# Patient Record
Sex: FEMALE | Race: WHITE | NOT HISPANIC OR LATINO | Employment: STUDENT | URBAN - METROPOLITAN AREA
[De-identification: names, ages, dates, MRNs, and addresses within clinical notes are randomized per-mention and may not be internally consistent; named-entity substitution may affect disease eponyms.]

---
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
## 2024-03-08 ENCOUNTER — OFFICE VISIT (OUTPATIENT)
Dept: FAMILY MEDICINE CLINIC | Facility: CLINIC | Age: 19
End: 2024-03-08
Payer: COMMERCIAL

## 2024-03-08 VITALS
BODY MASS INDEX: 26.66 KG/M2 | HEIGHT: 69 IN | SYSTOLIC BLOOD PRESSURE: 118 MMHG | DIASTOLIC BLOOD PRESSURE: 64 MMHG | TEMPERATURE: 98.4 F | WEIGHT: 180 LBS | RESPIRATION RATE: 20 BRPM

## 2024-03-08 DIAGNOSIS — U07.1 COVID-19 VIRUS INFECTION: ICD-10-CM

## 2024-03-08 DIAGNOSIS — Z20.822 EXPOSURE TO COVID-19 VIRUS: Primary | ICD-10-CM

## 2024-03-08 LAB
EXPIRATION DATE: NORMAL
INTERNAL CONTROL: NORMAL
Lab: NORMAL
SARS-COV-2 AG UPPER RESP QL IA.RAPID: NOT DETECTED

## 2024-03-08 NOTE — PROGRESS NOTES
"    Lavell More DO  [facility]  [address] [place] [address] [address]  [place] [id number] [phone]    Subjective      Name Edith Ward MRN [id number]    2005 AGE/SEX 18 y.o. / female      Chief Complaint Chief Complaint   Patient presents with    Exposure To Known Illness     Patient has been exposed to Covid virus, started with sore throat x 1 day ago          Visit History for  2024    History of Present Illness  Edith Ward is a 18 y.o. female who presented today for Exposure To Known Illness (Patient has been exposed to Covid virus, started with sore throat x 1 day ago )  .    The patient presents for evaluation of COVID-19 infection.    COVID-19 infection  Tested positive for COVID-19 infection. Sore throat began 2024. Denies any other health concerns. Father tested positive on 2024 or 2024.       Medications and Allergies   Current Outpatient Medications   Medication Instructions    diazePAM (VALIUM) 5 MG tablet PLEASE SEE ATTACHED FOR DETAILED DIRECTIONS    escitalopram (LEXAPRO) 20 mg, Oral, Daily    ferrous gluconate (FERGON) 324 mg, Daily With Breakfast    hydrOXYzine pamoate (VISTARIL) 25 MG capsule TAKE 1 TABLET BY MOUTH DAILY AT NIGHT FOR SLEEP    melatonin 5 mg    Naproxen Sodium 220 MG capsule Take  by mouth.    propranolol (INDERAL) 10 mg, 2 Times Daily PRN     No Known Allergies   I have reviewed the above medications and allergies     Objective:      Vitals Vitals:    24 1153   BP: 118/64   BP Location: Left arm   Patient Position: Sitting   Cuff Size: Adult   Resp: 20   Temp: 98.4 °F (36.9 °C)   TempSrc: Oral   Weight: 81.6 kg (180 lb)   Height: 175.3 cm (69.02\")     Body mass index is 26.57 kg/m².    Physical Exam  Vitals reviewed.   Constitutional:       General: She is not in acute distress.     Appearance: She is not ill-appearing.   HENT:      Nose: Rhinorrhea present.      Mouth/Throat:      Pharynx: Posterior " oropharyngeal erythema present.   Cardiovascular:      Rate and Rhythm: Normal rate and regular rhythm.   Pulmonary:      Effort: Pulmonary effort is normal.      Breath sounds: Normal breath sounds.   Neurological:      Mental Status: She is alert.   Psychiatric:         Mood and Affect: Mood normal.         Behavior: Behavior normal.         Thought Content: Thought content normal.         Judgment: Judgment normal.            Assessment/Plan      Issues Addressed/ Plan   Diagnosis Plan   1. Exposure to COVID-19 virus  POCT SARS-CoV-2 Antigen MICHELLE      2. COVID-19 virus infection           1. COVID-19 infection  She was advised to quarantine for 5 days from the onset of his symptoms. She was advised to wear a mask if she is going out in public. She was advised to use over-the-counter medicines for her symptoms. She was advised to drink cool liquids, and take Tylenol, ibuprofen, or Cepacol lozenges for her sore throat. She was advised to use a neti pot.     There are no Patient Instructions on file for this visit.  Pediatric BMI = 86 %ile (Z= 1.10) based on CDC (Girls, 2-20 Years) BMI-for-age based on BMI available as of 3/8/2024..       Follow up  recommended Return if symptoms worsen or fail to improve.   Lavell More DO     Transcribed from ambient dictation for Lavell More DO by Rachel Johns.  03/08/24   13:19 EST    Patient or patient representative verbalized consent to the visit recording.  I have personally performed the services described in this document as transcribed by the above individual, and it is both accurate and complete.

## 2024-04-09 ENCOUNTER — OFFICE VISIT (OUTPATIENT)
Dept: FAMILY MEDICINE CLINIC | Facility: CLINIC | Age: 19
End: 2024-04-09
Payer: COMMERCIAL

## 2024-04-09 VITALS
HEIGHT: 69 IN | SYSTOLIC BLOOD PRESSURE: 118 MMHG | DIASTOLIC BLOOD PRESSURE: 82 MMHG | OXYGEN SATURATION: 99 % | HEART RATE: 58 BPM | RESPIRATION RATE: 18 BRPM | BODY MASS INDEX: 26.81 KG/M2 | WEIGHT: 181 LBS

## 2024-04-09 DIAGNOSIS — S80.861A TICK BITE OF RIGHT LOWER LEG, INITIAL ENCOUNTER: ICD-10-CM

## 2024-04-09 DIAGNOSIS — F90.0 ATTENTION DEFICIT HYPERACTIVITY DISORDER (ADHD), PREDOMINANTLY INATTENTIVE TYPE: Primary | ICD-10-CM
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
## 2024-04-09 PROCEDURE — 99214 OFFICE O/P EST MOD 30 MIN: CPT | Performed by: STUDENT IN AN ORGANIZED HEALTH CARE EDUCATION/TRAINING PROGRAM

## 2024-04-09 RX ORDER — DEXTROAMPHETAMINE SACCHARATE, AMPHETAMINE ASPARTATE, DEXTROAMPHETAMINE SULFATE AND AMPHETAMINE SULFATE 2.5; 2.5; 2.5; 2.5 MG/1; MG/1; MG/1; MG/1
10 TABLET ORAL DAILY
Qty: 30 TABLET | Refills: 0 | Status: SHIPPED | OUTPATIENT
Start: 2024-04-09

## 2024-04-09 RX ORDER — DOXYCYCLINE HYCLATE 100 MG/1
100 CAPSULE ORAL 2 TIMES DAILY
Qty: 20 CAPSULE | Refills: 0 | Status: SHIPPED | OUTPATIENT
Start: 2024-04-09 | End: 2024-04-19

## 2024-04-09 NOTE — PROGRESS NOTES
Lavell More DO  [facility]  [address]  [place] [id number] [phone]    Subjective      Name Edith Ward MRN [id number]    2005 AGE/SEX 18 y.o. / female      Chief Complaint Chief Complaint   Patient presents with    Insect Bite     X 2 days ago, painful. On right leg          Visit History for  2024    History of Present Illness  Edith Ward is a 18 y.o. female who presented today for Insect Bite (X 2 days ago, painful. On right leg )        The patient reports a severe tick bite on her right proximal lower leg, which occurred two days prior. The tick, which had been attached for more than four hours, continues to exhibit swelling and an increase in size. The affected area is persistently irritated, leading to frequent itching. The patient has been self-administering topical Benadryl at night to manage the itching.    The patient expresses a desire to undergo an ADHD diagnosis and/or medication. She has a family history of ADHD, with both her mother, father, and older brother having ADHD. Currently, she is not enrolled in school and has three jobs. She expresses a desire to commence medication to improve her functionality, as she often forgets things, leaves ingredients, dishwashing, and food out, and frequently gets kicked on by her siblings. She does not believe the ADHD affects her work performance. Morning difficulty is reported, and she is uncertain if this is related to her dysfunctional ADHD. Despite her desire to attend college next year, she expresses concern that she has not made significant progress. She participated in [employer], which she did not enjoy due to forgetfulness.       Medications and Allergies   Current Outpatient Medications   Medication Instructions    amphetamine-dextroamphetamine (ADDERALL) 10 MG tablet 10 mg, Oral, Daily    doxycycline (VIBRAMYCIN) 100 mg, Oral, 2 Times Daily    escitalopram  "(LEXAPRO) 20 mg, Oral, Daily    ferrous gluconate (FERGON) 324 mg, Daily With Breakfast    melatonin 5 mg, Oral     No Known Allergies   I have reviewed the above medications and allergies     Objective:      Vitals Vitals:    04/09/24 1618   BP: 118/82   BP Location: Left arm   Patient Position: Sitting   Cuff Size: Adult   Pulse: 58   Resp: 18   SpO2: 99%   Weight: 82.1 kg (181 lb)   Height: 175.3 cm (69.02\")     Body mass index is 26.71 kg/m².    Physical Exam  Vitals reviewed.   Constitutional:       General: She is not in acute distress.     Appearance: She is not ill-appearing.   Pulmonary:      Effort: Pulmonary effort is normal.   Psychiatric:         Mood and Affect: Mood normal.         Behavior: Behavior normal.         Thought Content: Thought content normal.         Judgment: Judgment normal.       Physical Exam     Results          Assessment/Plan      Issues Addressed/ Plan   Diagnosis Plan   1. Attention deficit hyperactivity disorder (ADHD), predominantly inattentive type  amphetamine-dextroamphetamine (ADDERALL) 10 MG tablet      2. Tick bite of right lower leg, initial encounter  doxycycline (VIBRAMYCIN) 100 MG capsule         Assessment & Plan  1. Attention Deficit Hyperactivity Disorder (ADHD).  The patient will be initiated on a regimen of extended-release Adderall, to be taken twice daily, upon waking. She has been advised to establish a daily planning session as soon as she is awake, followed by a weekly planning session. Additionally, she has been advised to limit her energy drink intake.    2. Tick bite on the right proximal lower leg.  The patient has been advised against the concurrent use of iron supplements. A prescription for doxycycline has been issued, to be taken for a duration of 10 days.    Follow-up  The patient is scheduled for a follow-up visit in 1 month, or earlier if the medication proves ineffective.   Pediatric BMI = 87 %ile (Z= 1.12) based on CDC (Girls, 2-20 Years) " BMI-for-age based on BMI available as of 4/9/2024.. BMI is >= 25 and <30. (Overweight) The following options were offered after discussion;: exercise counseling/recommendations and nutrition counseling/recommendations       There are no Patient Instructions on file for this visit.        Follow up  recommended Return in about 1 month (around 5/9/2024) for adhd.   - Dragon voice recognition software was utilized to complete this chart.  Every reasonable attempt was made to edit and correct the text, however some incorrect words may remain.   Lavell More DO    Patient or patient representative verbalized consent for the use of Ambient Listening during the visit with  Lavell More DO for chart documentation. 4/29/2024  00:09 EDT

## 2024-04-11 ENCOUNTER — PRIOR AUTHORIZATION (OUTPATIENT)
Dept: FAMILY MEDICINE CLINIC | Facility: CLINIC | Age: 19
End: 2024-04-11
Payer: COMMERCIAL

## 2024-05-08 ENCOUNTER — OFFICE VISIT (OUTPATIENT)
Dept: FAMILY MEDICINE CLINIC | Facility: CLINIC | Age: 19
End: 2024-05-08
Payer: COMMERCIAL

## 2024-05-08 VITALS
WEIGHT: 174.8 LBS | BODY MASS INDEX: 25.89 KG/M2 | SYSTOLIC BLOOD PRESSURE: 122 MMHG | OXYGEN SATURATION: 100 % | HEART RATE: 65 BPM | RESPIRATION RATE: 18 BRPM | HEIGHT: 69 IN | DIASTOLIC BLOOD PRESSURE: 78 MMHG

## 2024-05-08 DIAGNOSIS — F90.0 ATTENTION DEFICIT HYPERACTIVITY DISORDER (ADHD), PREDOMINANTLY INATTENTIVE TYPE: ICD-10-CM

## 2024-05-08 PROCEDURE — 99213 OFFICE O/P EST LOW 20 MIN: CPT | Performed by: STUDENT IN AN ORGANIZED HEALTH CARE EDUCATION/TRAINING PROGRAM

## 2024-05-08 RX ORDER — DEXTROAMPHETAMINE SACCHARATE, AMPHETAMINE ASPARTATE MONOHYDRATE, DEXTROAMPHETAMINE SULFATE AND AMPHETAMINE SULFATE 2.5; 2.5; 2.5; 2.5 MG/1; MG/1; MG/1; MG/1
10 CAPSULE, EXTENDED RELEASE ORAL EVERY MORNING
Qty: 30 CAPSULE | Refills: 0 | Status: SHIPPED | OUTPATIENT
Start: 2024-05-08

## 2024-05-25 NOTE — PROGRESS NOTES
Lavell More DO  [facility]  [address]  [place] [id number] [phone]    Subjective      Name Edith Ward MRN [id number]    2005 AGE/SEX 18 y.o. / female      Chief Complaint Chief Complaint   Patient presents with    ADHD     Medication follow up          Visit History for  2024    History of Present Illness  Edith Ward is a 18 y.o. female who presented today for ADHD (Medication follow up )      The patient reports an improvement in his condition since our last consultation. She now utilizes an alarm 1 to 2 hours prior to waking, administering Adderall, and subsequently awakens once the alarm goes off the second time. She has successfully applied for a college visit and received letters and recommendations. She has been on Lexapro for an extended period, which she finds beneficial. She has not explored alternative medication options. Initially, the efficacy of Adderall was initially diminished, but it has since decreased. She acknowledges a recent increase in caffeine intake, which she finds distressing. She maintains a record of his productivity and communicates with numerous individuals. She has downloaded a calendar clifford to create three separate calendars for responses to finalize and complete tasks. She attempts to perform stretches, albeit inconsistently. She engages in horse riding, but refrains from stretching due to her hypermobility. She recently ceased employment due to a three-month schedule interruption.        Medications and Allergies   Current Outpatient Medications   Medication Instructions    amphetamine-dextroamphetamine XR (Adderall XR) 10 MG 24 hr capsule 10 mg, Oral, Every Morning    escitalopram (LEXAPRO) 20 mg, Oral, Daily    melatonin 3 mg, Oral, PRN     No Known Allergies   I have reviewed the above medications and allergies     Objective:      Vitals Vitals:    24 1126   BP: 122/78   BP Location: Right  "arm   Patient Position: Sitting   Cuff Size: Adult   Pulse: 65   Resp: 18   SpO2: 100%   Weight: 79.3 kg (174 lb 12.8 oz)   Height: 175.3 cm (69.02\")     Body mass index is 25.8 kg/m².    Physical Exam  Vitals reviewed.   Constitutional:       General: She is not in acute distress.     Appearance: She is not ill-appearing.   Cardiovascular:      Rate and Rhythm: Normal rate and regular rhythm.   Pulmonary:      Effort: Pulmonary effort is normal.      Breath sounds: Normal breath sounds.   Neurological:      Mental Status: She is alert.   Psychiatric:         Mood and Affect: Mood normal.         Behavior: Behavior normal.         Thought Content: Thought content normal.         Judgment: Judgment normal.       Physical Exam     Results          Assessment/Plan      Issues Addressed/ Plan   Diagnosis Plan   1. Attention deficit hyperactivity disorder (ADHD), predominantly inattentive type  amphetamine-dextroamphetamine XR (Adderall XR) 10 MG 24 hr capsule         Assessment & Plan  1. Attention Deficit Hyperactivity Disorder (ADHD).  The patient was counseled to abstain from Adderall on a day when she perceives a decrease in need to concentrate. A prescription for extended-release Adderall 24 mg, to be taken in the morning, was issued. The patient was also advised to implement a calendar and reminders to ensure productivity. Additionally, the patient was encouraged to engage in regular stretching exercises and to incorporate a 5-minute stretch routine with each session. Should the patient perceive the current Adderall dosage as effective, she will inform us, at which point a dosage adjustment may be necessary.    Follow-up  The patient is scheduled for a follow-up visit in 3 months.           There are no Patient Instructions on file for this visit.        Follow up  recommended Return in about 3 months (around 8/8/2024) for ADHD.   - Dragon voice recognition software was utilized to complete this chart.  Every " reasonable attempt was made to edit and correct the text, however some incorrect words may remain.   Lavell More DO    Patient or patient representative verbalized consent for the use of Ambient Listening during the visit with  Lavell More DO for chart documentation. 5/25/2024  01:16 EDT

## 2024-05-30 ENCOUNTER — PATIENT MESSAGE (OUTPATIENT)
Dept: FAMILY MEDICINE CLINIC | Facility: CLINIC | Age: 19
End: 2024-05-30
Payer: COMMERCIAL

## 2024-05-31 ENCOUNTER — TELEPHONE (OUTPATIENT)
Dept: FAMILY MEDICINE CLINIC | Facility: CLINIC | Age: 19
End: 2024-05-31
Payer: COMMERCIAL

## 2024-05-31 DIAGNOSIS — D50.8 IRON DEFICIENCY ANEMIA SECONDARY TO INADEQUATE DIETARY IRON INTAKE: Primary | ICD-10-CM

## 2024-05-31 DIAGNOSIS — F90.0 ATTENTION DEFICIT HYPERACTIVITY DISORDER (ADHD), PREDOMINANTLY INATTENTIVE TYPE: ICD-10-CM

## 2024-05-31 NOTE — TELEPHONE ENCOUNTER
Edith De [facility]man P[facility] (supporting Lavell More DO)15 hours ago (6:05 PM)       Hi! So I have figured out that the long release does approximately nothing. I would very much appreciate if I could go back on the instant release as soon as possible to help get life back in order!     Thanks!  Edith

## 2024-06-03 RX ORDER — DEXTROAMPHETAMINE SACCHARATE, AMPHETAMINE ASPARTATE MONOHYDRATE, DEXTROAMPHETAMINE SULFATE AND AMPHETAMINE SULFATE 5; 5; 5; 5 MG/1; MG/1; MG/1; MG/1
20 CAPSULE, EXTENDED RELEASE ORAL EVERY MORNING
Qty: 30 CAPSULE | Refills: 0 | Status: SHIPPED | OUTPATIENT
Start: 2024-06-03

## 2024-06-03 NOTE — TELEPHONE ENCOUNTER
Edith De [facility] (supporting Lavell More DO)Just now (9:04 AM)       Ok. I honestly don't think it will work, but we can try. My mom and brother have both experienced both types of adderall and have said that the long release does nothing, so I'm worried it won't do anything, as I remember how well the instant release worked. My sleep scedule is off and getting up the the morning is harder, but I guess we can try a dose up. Could My iron supplement also be called in? Thanks!

## 2024-06-03 NOTE — TELEPHONE ENCOUNTER
May just need to increase the dose.  Lets try one dose increase and if it doesn't work, we can go back to the IR

## 2024-06-04 RX ORDER — FERROUS GLUCONATE 324(38)MG
324 TABLET ORAL
Qty: 30 TABLET | Refills: 2 | Status: SHIPPED | OUTPATIENT
Start: 2024-06-04

## 2024-07-02 ENCOUNTER — OFFICE VISIT (OUTPATIENT)
Dept: FAMILY MEDICINE CLINIC | Facility: CLINIC | Age: 19
End: 2024-07-02
Payer: COMMERCIAL

## 2024-07-02 VITALS
SYSTOLIC BLOOD PRESSURE: 122 MMHG | RESPIRATION RATE: 18 BRPM | DIASTOLIC BLOOD PRESSURE: 64 MMHG | BODY MASS INDEX: 26.07 KG/M2 | OXYGEN SATURATION: 98 % | HEART RATE: 90 BPM | WEIGHT: 176 LBS | HEIGHT: 69 IN

## 2024-07-02 DIAGNOSIS — F90.0 ATTENTION DEFICIT HYPERACTIVITY DISORDER (ADHD), PREDOMINANTLY INATTENTIVE TYPE: Primary | ICD-10-CM

## 2024-07-02 PROCEDURE — 99213 OFFICE O/P EST LOW 20 MIN: CPT | Performed by: STUDENT IN AN ORGANIZED HEALTH CARE EDUCATION/TRAINING PROGRAM

## 2024-07-02 RX ORDER — CETIRIZINE HYDROCHLORIDE 10 MG/1
10 TABLET ORAL DAILY
COMMUNITY

## 2024-07-02 RX ORDER — DEXTROAMPHETAMINE SACCHARATE, AMPHETAMINE ASPARTATE, DEXTROAMPHETAMINE SULFATE AND AMPHETAMINE SULFATE 5; 5; 5; 5 MG/1; MG/1; MG/1; MG/1
20 TABLET ORAL 2 TIMES DAILY
Qty: 60 TABLET | Refills: 0 | Status: SHIPPED | OUTPATIENT
Start: 2024-07-02

## 2024-07-07 NOTE — PROGRESS NOTES
"    Lavell More DO  [facility]  [facility]  [place] [id number] [phone]    Subjective      Name Edith Ward MRN [id number]    2005 AGE/SEX 18 y.o. / female      Chief Complaint Chief Complaint   Patient presents with    ADHD     Would like to discuss going back to immediate release instead of extended release          Visit History for  2024    Edith Ward is a 18 y.o. female who presented today for ADHD (Would like to discuss going back to immediate release instead of extended release )     History of Present Illness  The patient presents for follow-up.    The patient reports no significant changes since her last visit. She finds the immediate-release formulation Adderall more effective, preferring the immediate release formulation in the morning. However, she expresses a lack of motivation towards the end of the day when the effects of the medication wear off. She expresses a preference for maintaining multiple pills over non-functional. The immediate release formulation has been more beneficial than the extended release formulation.       Medications and Allergies   Current Outpatient Medications   Medication Instructions    amphetamine-dextroamphetamine (ADDERALL) 20 MG tablet 20 mg, Oral, 2 Times Daily    cetirizine (ZYRTEC) 10 mg, Oral, Daily    escitalopram (LEXAPRO) 20 mg, Oral, Daily    ferrous gluconate (FERGON) 324 mg, Oral, Daily With Breakfast    melatonin 3 mg, Oral, PRN     No Known Allergies   I have reviewed the above medications and allergies     Objective:      Vitals Vitals:    24 1609   BP: 122/64   BP Location: Left arm   Patient Position: Sitting   Cuff Size: Adult   Pulse: 90   Resp: 18   SpO2: 98%   Weight: 79.8 kg (176 lb)   Height: 175.3 cm (69.02\")     Body mass index is 25.98 kg/m².    Physical Exam  Vitals reviewed.   Constitutional:       General: She is not in acute distress.     Appearance: She is not " ill-appearing.   Pulmonary:      Effort: Pulmonary effort is normal.   Psychiatric:         Mood and Affect: Mood normal.         Behavior: Behavior normal.         Thought Content: Thought content normal.         Judgment: Judgment normal.      Physical Exam       Results       Assessment/Plan   Issues Addressed/ Plan   Diagnosis Plan   1. Attention deficit hyperactivity disorder (ADHD), predominantly inattentive type  amphetamine-dextroamphetamine (ADDERALL) 20 MG tablet         Assessment & Plan  1. Medication follow-up.  The immediate-release formulation will be replaced with an immediate-release version to be taken in the afternoon if required. It is recommended that her iron levels be assessed during her next visit.    Follow-up  A follow-up appointment is scheduled for 08/07/2024.           There are no Patient Instructions on file for this visit.   Follow up  recommended No follow-ups on file.   - Dragon voice recognition software was utilized to complete this chart.  Every reasonable attempt was made to edit and correct the text, however some incorrect words may remain.   Lavell More DO    Patient or patient representative verbalized consent for the use of Ambient Listening during the visit with  Lavell More DO for chart documentation. 7/7/2024  20:37 EDT

## 2024-08-07 ENCOUNTER — OFFICE VISIT (OUTPATIENT)
Dept: FAMILY MEDICINE CLINIC | Facility: CLINIC | Age: 19
End: 2024-08-07
Payer: COMMERCIAL

## 2024-08-07 VITALS
BODY MASS INDEX: 25.33 KG/M2 | SYSTOLIC BLOOD PRESSURE: 106 MMHG | HEIGHT: 69 IN | RESPIRATION RATE: 18 BRPM | WEIGHT: 171 LBS | OXYGEN SATURATION: 100 % | DIASTOLIC BLOOD PRESSURE: 72 MMHG | HEART RATE: 84 BPM

## 2024-08-07 DIAGNOSIS — D50.8 IRON DEFICIENCY ANEMIA SECONDARY TO INADEQUATE DIETARY IRON INTAKE: ICD-10-CM

## 2024-08-07 DIAGNOSIS — R53.83 FATIGUE, UNSPECIFIED TYPE: ICD-10-CM

## 2024-08-07 DIAGNOSIS — F90.0 ATTENTION DEFICIT HYPERACTIVITY DISORDER (ADHD), PREDOMINANTLY INATTENTIVE TYPE: ICD-10-CM

## 2024-08-07 DIAGNOSIS — R73.9 HYPERGLYCEMIA: Primary | ICD-10-CM

## 2024-08-07 DIAGNOSIS — Z23 ENCOUNTER FOR IMMUNIZATION: ICD-10-CM

## 2024-08-07 PROCEDURE — 90715 TDAP VACCINE 7 YRS/> IM: CPT | Performed by: STUDENT IN AN ORGANIZED HEALTH CARE EDUCATION/TRAINING PROGRAM

## 2024-08-07 PROCEDURE — 99214 OFFICE O/P EST MOD 30 MIN: CPT | Performed by: STUDENT IN AN ORGANIZED HEALTH CARE EDUCATION/TRAINING PROGRAM

## 2024-08-07 PROCEDURE — 90471 IMMUNIZATION ADMIN: CPT | Performed by: STUDENT IN AN ORGANIZED HEALTH CARE EDUCATION/TRAINING PROGRAM

## 2024-08-07 RX ORDER — LISDEXAMFETAMINE DIMESYLATE 40 MG/1
40 CAPSULE ORAL EVERY MORNING
Qty: 30 CAPSULE | Refills: 0 | Status: SHIPPED | OUTPATIENT
Start: 2024-08-07 | End: 2024-08-12 | Stop reason: SDUPTHER

## 2024-08-07 RX ORDER — DEXTROAMPHETAMINE SACCHARATE, AMPHETAMINE ASPARTATE, DEXTROAMPHETAMINE SULFATE AND AMPHETAMINE SULFATE 5; 5; 5; 5 MG/1; MG/1; MG/1; MG/1
20 TABLET ORAL DAILY
Qty: 60 TABLET | Refills: 0 | Status: SHIPPED | OUTPATIENT
Start: 2024-08-07

## 2024-08-07 NOTE — PROGRESS NOTES
Venipuncture Blood Specimen Collection  Venipuncture performed in right arm by Lucia Guaman MA with good hemostasis. Patient tolerated the procedure well without complications.   08/07/24   Lucia Guaman MA

## 2024-08-08 ENCOUNTER — PATIENT ROUNDING (BHMG ONLY) (OUTPATIENT)
Dept: FAMILY MEDICINE CLINIC | Facility: CLINIC | Age: 19
End: 2024-08-08
Payer: COMMERCIAL

## 2024-08-08 LAB
ALBUMIN SERPL-MCNC: 4.3 G/DL (ref 4–5)
ALP SERPL-CCNC: 73 IU/L (ref 42–106)
ALT SERPL-CCNC: 13 IU/L (ref 0–32)
AST SERPL-CCNC: 21 IU/L (ref 0–40)
BASOPHILS # BLD AUTO: 0 X10E3/UL (ref 0–0.2)
BASOPHILS NFR BLD AUTO: 1 %
BILIRUB SERPL-MCNC: 0.2 MG/DL (ref 0–1.2)
BUN SERPL-MCNC: 8 MG/DL (ref 6–20)
BUN/CREAT SERPL: 12 (ref 9–23)
CALCIUM SERPL-MCNC: 9.6 MG/DL (ref 8.7–10.2)
CHLORIDE SERPL-SCNC: 103 MMOL/L (ref 96–106)
CO2 SERPL-SCNC: 24 MMOL/L (ref 20–29)
CREAT SERPL-MCNC: 0.69 MG/DL (ref 0.57–1)
EGFRCR SERPLBLD CKD-EPI 2021: 128 ML/MIN/1.73
EOSINOPHIL # BLD AUTO: 0.2 X10E3/UL (ref 0–0.4)
EOSINOPHIL NFR BLD AUTO: 2 %
ERYTHROCYTE [DISTWIDTH] IN BLOOD BY AUTOMATED COUNT: 11.7 % (ref 11.7–15.4)
GLOBULIN SER CALC-MCNC: 2.5 G/DL (ref 1.5–4.5)
GLUCOSE SERPL-MCNC: 93 MG/DL (ref 70–99)
HCT VFR BLD AUTO: 41.5 % (ref 34–46.6)
HGB BLD-MCNC: 13.5 G/DL (ref 11.1–15.9)
IMM GRANULOCYTES # BLD AUTO: 0 X10E3/UL (ref 0–0.1)
IMM GRANULOCYTES NFR BLD AUTO: 0 %
IRON SATN MFR SERPL: 19 % (ref 15–55)
IRON SERPL-MCNC: 51 UG/DL (ref 27–159)
LYMPHOCYTES # BLD AUTO: 2.6 X10E3/UL (ref 0.7–3.1)
LYMPHOCYTES NFR BLD AUTO: 38 %
MCH RBC QN AUTO: 29.5 PG (ref 26.6–33)
MCHC RBC AUTO-ENTMCNC: 32.5 G/DL (ref 31.5–35.7)
MCV RBC AUTO: 91 FL (ref 79–97)
MONOCYTES # BLD AUTO: 0.5 X10E3/UL (ref 0.1–0.9)
MONOCYTES NFR BLD AUTO: 7 %
NEUTROPHILS # BLD AUTO: 3.6 X10E3/UL (ref 1.4–7)
NEUTROPHILS NFR BLD AUTO: 52 %
PLATELET # BLD AUTO: 245 X10E3/UL (ref 150–450)
POTASSIUM SERPL-SCNC: 4 MMOL/L (ref 3.5–5.2)
PROT SERPL-MCNC: 6.8 G/DL (ref 6–8.5)
RBC # BLD AUTO: 4.57 X10E6/UL (ref 3.77–5.28)
SODIUM SERPL-SCNC: 140 MMOL/L (ref 134–144)
TIBC SERPL-MCNC: 271 UG/DL (ref 250–450)
UIBC SERPL-MCNC: 220 UG/DL (ref 131–425)
WBC # BLD AUTO: 7 X10E3/UL (ref 3.4–10.8)

## 2024-08-08 NOTE — PROGRESS NOTES
A Mychart message has been sent to the patient [facility] sent to the patient for PATIENT ROUNDING with [facility]

## 2024-08-12 ENCOUNTER — TELEPHONE (OUTPATIENT)
Dept: FAMILY MEDICINE CLINIC | Facility: CLINIC | Age: 19
End: 2024-08-12
Payer: COMMERCIAL

## 2024-08-12 DIAGNOSIS — F90.0 ATTENTION DEFICIT HYPERACTIVITY DISORDER (ADHD), PREDOMINANTLY INATTENTIVE TYPE: ICD-10-CM

## 2024-08-12 NOTE — TELEPHONE ENCOUNTER
----- Message from [facility] [employer] sent at 8/12/2024  4:36 PM EDT -----  Regarding: Vyvanse  Contact: [phone]  [facility] in [place] would be preferable

## 2024-08-12 NOTE — TELEPHONE ENCOUNTER
Caller: Edith Ward    Relationship: Self    Best call back number: [phone]    Who are you requesting to speak with (clinical staff, provider,  specific staff member): CLINICAL STAFF    What was the call regarding: STATED THAT THEY WOULD LIKE TO KNOW WHERE THE lisdexamfetamine (Vyvanse) 40 MG capsule HAS HAD THE MOST SUCCESS AT BEING CALLED IN SO THEY CAN GET IT AS THEIR REGULAR PHARMACY IS CURRENTLY ON BACK ORDER FOR IT. PLEASE REACH OUT THROUGH [email or web address] AND ADVISE     Is it okay if the provider responds through [employer]: YES, PREFERRED

## 2024-08-13 RX ORDER — LISDEXAMFETAMINE DIMESYLATE 40 MG/1
40 CAPSULE ORAL EVERY MORNING
Qty: 30 CAPSULE | Refills: 0 | Status: SHIPPED | OUTPATIENT
Start: 2024-08-13

## 2024-08-18 NOTE — PROGRESS NOTES
Lavell More DO  [facility]  [facility]  [place] [id number] [phone]    Subjective      Name Edith Ward MRN [id number]    2005 AGE/SEX 18 y.o. / female      Chief Complaint Chief Complaint   Patient presents with    ADHD     Check up          Visit History for  2024    Edith Ward is a 18 y.o. female who presented today for ADHD (Check up )     History of Present Illness  The patient presents for evaluation of ADHD.    She reports that her current medications are effective, but she feels the need to increase the dosage of her morning medication due to her upcoming move to college. She is considering taking immediate medication in the morning and afternoon. The extended-release version of her medication was not particularly fond of, but it provided relief for the rest of the day. She has resumed drinking energy drinks, particularly in the mornings. She has not tried Vyvanse, but is open to trying it. She typically takes her medication on an empty stomach.    She discontinued her iron supplement 1 to 2 days ago. As the heat conditions worsen, her orthostatic issues have worsened. She also experiences numbness in her foot.    She occasionally experiences a sensation in her neck dislocating, which resolves after applying pressure. This sensation occurs roughly once a month.    Supplemental Information  She had a horrible fear of needles, but she has gotten a lot better.    IMMUNIZATIONS  She received her meningococcal vaccine 2 months ago.       Medications and Allergies   Current Outpatient Medications   Medication Instructions    amphetamine-dextroamphetamine (ADDERALL) 20 MG tablet 20 mg, Oral, Daily    cetirizine (ZYRTEC) 10 mg, Oral, Daily    escitalopram (LEXAPRO) 20 mg, Oral, Daily    ferrous gluconate (FERGON) 324 mg, Oral, Daily With Breakfast    lisdexamfetamine (VYVANSE) 40 mg, Oral, Every Morning    melatonin 3 mg, Oral, PRN     " No Known Allergies   I have reviewed the above medications and allergies     Objective:      Vitals Vitals:    08/07/24 0900   BP: 106/72   BP Location: Left arm   Patient Position: Sitting   Cuff Size: Adult   Pulse: 84   Resp: 18   SpO2: 100%   Weight: 77.6 kg (171 lb)   Height: 175.3 cm (69.02\")     Body mass index is 25.24 kg/m².    Physical Exam  Vitals reviewed.   Constitutional:       General: She is not in acute distress.     Appearance: She is not ill-appearing.   Pulmonary:      Effort: Pulmonary effort is normal.   Psychiatric:         Mood and Affect: Mood normal.         Behavior: Behavior normal.         Thought Content: Thought content normal.         Judgment: Judgment normal.        Physical Exam       Results       Assessment/Plan   Issues Addressed/ Plan   Diagnosis Plan   1. Hyperglycemia  Comprehensive metabolic panel      2. Attention deficit hyperactivity disorder (ADHD), predominantly inattentive type  amphetamine-dextroamphetamine (ADDERALL) 20 MG tablet      3. Fatigue, unspecified type  CBC w AUTO Differential    Comprehensive metabolic panel      4. Iron deficiency anemia secondary to inadequate dietary iron intake  CBC w AUTO Differential    Iron Profile      5. Encounter for immunization  Tdap Vaccine Greater Than or Equal To 8yo IM         Assessment & Plan  1. Attention deficit hyperactivity disorder.  A prescription for Vyvanse has been provided, along with a prescription for immediate-release Adderall 20 mg.    2. Orthostatic hypotension.  A blood test will be conducted to assess her iron levels and electrolytes. She has been advised to maintain adequate hydration.    3. Neck pain.  She has been advised to massage her neck to alleviate discomfort. Should the neck pain persist, she is to inform me.    Follow-up  A follow-up visit is scheduled for 6 months from now.           There are no Patient Instructions on file for this visit.   Follow up  recommended Return in about 6 months " (around 2/7/2025).   - Dragon voice recognition software was utilized to complete this chart.  Every reasonable attempt was made to edit and correct the text, however some incorrect words may remain.   Lavell More DO    Patient or patient representative verbalized consent for the use of Ambient Listening during the visit with  Lavell More DO for chart documentation. 8/18/2024  02:52 EDT

## 2024-08-31 ENCOUNTER — PATIENT MESSAGE (OUTPATIENT)
Dept: FAMILY MEDICINE CLINIC | Facility: CLINIC | Age: 19
End: 2024-08-31
Payer: COMMERCIAL

## 2024-09-03 ENCOUNTER — TELEPHONE (OUTPATIENT)
Dept: FAMILY MEDICINE CLINIC | Facility: CLINIC | Age: 19
End: 2024-09-03
Payer: COMMERCIAL

## 2024-09-03 DIAGNOSIS — G47.00 INSOMNIA, UNSPECIFIED TYPE: Primary | ICD-10-CM

## 2024-09-03 DIAGNOSIS — G47.20 SLEEP PATTERN DISTURBANCE: ICD-10-CM

## 2024-09-03 NOTE — TELEPHONE ENCOUNTER
----- Message from [facility] [employer] sent at 8/31/2024  9:43 PM EDT -----  Regarding: Scedule a sleep study?  Contact: [phone]  Tenzin More! I hope all is well with you in [place]! I was hoping to chat about scheduling a sleep study for December when I am in town on holiday break. I have always had trouble waking up, but with the intensity if college and my workload, it is much much worse, almost as bad as in middle school, possibly worse. I have missed and been late to multiple morning classes because I can't figure out how to wake myself up. Last night, I slept for 13 or more hours. I really need help figuring this out. If you have any advice in the meantime, I would love to hear it.    Thanks  Edith

## 2024-09-03 NOTE — TELEPHONE ENCOUNTER
From: Edith Ward  To: Lavell More  Sent: 8/31/2024 9:43 PM EDT  Subject: Scedule a sleep study?    Hi Dr More! I hope all is well with you in [place]! I was hoping to chat about scheduling a sleep study for December when I am in town on holiday break. I have always had trouble waking up, but with the intensity if college and my workload, it is much much worse, almost as bad as in middle school, possibly worse. I have missed and been late to multiple morning classes because I can't figure out how to wake myself up. Last night, I slept for 13 or more hours. I really need help figuring this out. If you have any advice in the meantime, I would love to hear it.    Thanks  Edith

## 2024-09-06 DIAGNOSIS — D50.8 IRON DEFICIENCY ANEMIA SECONDARY TO INADEQUATE DIETARY IRON INTAKE: ICD-10-CM

## 2024-09-06 RX ORDER — FERROUS GLUCONATE 324(38)MG
1 TABLET ORAL
Qty: 90 TABLET | Refills: 0 | Status: SHIPPED | OUTPATIENT
Start: 2024-09-06

## 2024-10-01 ENCOUNTER — PATIENT MESSAGE (OUTPATIENT)
Dept: FAMILY MEDICINE CLINIC | Facility: CLINIC | Age: 19
End: 2024-10-01
Payer: COMMERCIAL

## 2024-10-01 DIAGNOSIS — F90.0 ATTENTION DEFICIT HYPERACTIVITY DISORDER (ADHD), PREDOMINANTLY INATTENTIVE TYPE: ICD-10-CM

## 2024-10-01 NOTE — TELEPHONE ENCOUNTER
Pt has not been taking her medication and has been out of them for over a month. I did tell her that she needed an appointment to restart her medications.

## 2024-10-01 NOTE — TELEPHONE ENCOUNTER
From: Edith Ward  To: Lavell More  Sent: 10/1/2024 8:56 AM EDT  Subject: Meds    I have not had my adhd meds for over a month. How can I start back on them? I have missed many classes and my grades are suffering, not to mention I have been forgetting to take my anxiety meds for a while as well. What do I needed to do to start this process again?    Thanks  Edith

## 2024-10-03 RX ORDER — LISDEXAMFETAMINE DIMESYLATE 40 MG/1
40 CAPSULE ORAL EVERY MORNING
Qty: 30 CAPSULE | Refills: 0 | Status: SHIPPED | OUTPATIENT
Start: 2024-10-03

## 2024-10-03 RX ORDER — DEXTROAMPHETAMINE SACCHARATE, AMPHETAMINE ASPARTATE, DEXTROAMPHETAMINE SULFATE AND AMPHETAMINE SULFATE 5; 5; 5; 5 MG/1; MG/1; MG/1; MG/1
20 TABLET ORAL DAILY
Qty: 60 TABLET | Refills: 0 | Status: SHIPPED | OUTPATIENT
Start: 2024-10-03

## 2024-11-06 ENCOUNTER — TELEMEDICINE (OUTPATIENT)
Dept: FAMILY MEDICINE CLINIC | Facility: CLINIC | Age: 19
End: 2024-11-06
Payer: COMMERCIAL

## 2024-11-06 DIAGNOSIS — G47.20 SLEEP PATTERN DISTURBANCE: ICD-10-CM

## 2024-11-06 DIAGNOSIS — F90.0 ATTENTION DEFICIT HYPERACTIVITY DISORDER (ADHD), PREDOMINANTLY INATTENTIVE TYPE: Primary | ICD-10-CM

## 2024-11-06 PROCEDURE — 99213 OFFICE O/P EST LOW 20 MIN: CPT | Performed by: STUDENT IN AN ORGANIZED HEALTH CARE EDUCATION/TRAINING PROGRAM

## 2024-11-06 RX ORDER — DEXTROAMPHETAMINE SACCHARATE, AMPHETAMINE ASPARTATE, DEXTROAMPHETAMINE SULFATE AND AMPHETAMINE SULFATE 7.5; 7.5; 7.5; 7.5 MG/1; MG/1; MG/1; MG/1
30 TABLET ORAL DAILY
Qty: 30 TABLET | Refills: 0 | Status: SHIPPED | OUTPATIENT
Start: 2024-11-06

## 2024-11-06 NOTE — PROGRESS NOTES
Lavell More,   [facility] PRIMARY CARE  [facility]  [place] [id number] [phone]    Subjective      Name Edith Ward MRN [id number]    2005 AGE/SEX 18 y.o. / female      Chief Complaint No chief complaint on file.        Visit History for  2024    Edith Ward is a 18 y.o. female who presented today for No chief complaint on file.       History of Present Illness  The patient presents today after consenting to a video telemedicine visit.    She is currently on a 20 mg dose of Vyvanse but occasionally requires an additional dose in the evening. However, she has not yet taken the second dose due to concerns about running out of medication. She is considering increasing her Vyvanse dosage.    She experiences a complete lack of energy upon returning to her dorm at the end of the day, despite feeling well during the day. She is also struggling with waking up in the morning and attending her classes. Her bedtime varies depending on her study schedule, but she tries not to stay up past 2:00 AM. Despite setting alarms, she finds it difficult to get out of bed. She has tried using a vibrating pad to wake up, but has become accustomed to it and it no longer helps. She has scheduled a sleep study for 2024.       Medications and Allergies   Current Outpatient Medications   Medication Instructions    amphetamine-dextroamphetamine (ADDERALL) 30 MG tablet 30 mg, Oral, Daily    cetirizine (ZYRTEC) 10 mg, Oral, Daily    escitalopram (LEXAPRO) 20 mg, Oral, Daily    lisdexamfetamine (VYVANSE) 40 mg, Oral, Every Morning    melatonin 3 mg, Oral, PRN     No Known Allergies   I have reviewed the above medications and allergies     Objective:      Vitals There were no vitals filed for this visit.  There is no height or weight on file to calculate BMI.    Physical Exam     Physical Exam       Results       Assessment/Plan   Issues Addressed/ Plan   Diagnosis Plan    1. Attention deficit hyperactivity disorder (ADHD), predominantly inattentive type  amphetamine-dextroamphetamine (ADDERALL) 30 MG tablet      2. Sleep pattern disturbance           Assessment & Plan  1. Attention Deficit Hyperactivity Disorder (ADHD).  A prescription for Adderall 30 mg was provided, with instructions to halve the dose to 15 mg in the morning and 15 mg in the evening. She was advised to abstain from the afternoon dose on weekends or days off to maintain the medication's potency. Continuation of daily Vyvanse was recommended, but Adderall use at home was discouraged. Emphasis was placed on the importance of adequate sleep and nutrition due to the potential appetite-suppressing effects of the medication.    2. Sleep Issues.  A sleep study was ordered to further investigate her sleep issues. She was advised to prioritize sleep and consider using physical stimuli like shock watches to help wake up.    Follow-up  Return in 3 months for follow up.           There are no Patient Instructions on file for this visit.   Follow up  recommended Return in about 3 months (around 2/6/2025).   - Dragon voice recognition software was utilized to complete this chart.  Every reasonable attempt was made to edit and correct the text, however some incorrect words may remain.   Lavell More DO    Patient or patient representative verbalized consent for the use of Ambient Listening during the visit with  Lavell More DO for chart documentation. 11/18/2024  00:56 EST

## 2024-11-18 DIAGNOSIS — F90.0 ATTENTION DEFICIT HYPERACTIVITY DISORDER (ADHD), PREDOMINANTLY INATTENTIVE TYPE: ICD-10-CM

## 2024-11-18 NOTE — TELEPHONE ENCOUNTER
[facility] Clinical Pool (supporting Lavell More DO)19 hours ago (1:57 PM)       Hi, I went to go  my meds, but the pharmacist said that there was not a script for Vyvanse? The Adderall is ok, they have that, but not the Vyvanse. Can this be fixed?     Thanks  Edith Ward

## 2024-11-19 RX ORDER — LISDEXAMFETAMINE DIMESYLATE 40 MG/1
40 CAPSULE ORAL EVERY MORNING
Qty: 30 CAPSULE | Refills: 0 | Status: SHIPPED | OUTPATIENT
Start: 2024-11-19

## 2024-12-19 ENCOUNTER — OFFICE VISIT (OUTPATIENT)
Dept: SLEEP MEDICINE | Facility: HOSPITAL | Age: 19
End: 2024-12-19
Payer: COMMERCIAL

## 2024-12-19 VITALS
BODY MASS INDEX: 23.25 KG/M2 | WEIGHT: 157 LBS | DIASTOLIC BLOOD PRESSURE: 82 MMHG | HEART RATE: 95 BPM | HEIGHT: 69 IN | SYSTOLIC BLOOD PRESSURE: 133 MMHG | OXYGEN SATURATION: 100 %

## 2024-12-19 DIAGNOSIS — R06.81 WITNESSED EPISODE OF APNEA: ICD-10-CM

## 2024-12-19 DIAGNOSIS — R29.818 SUSPECTED SLEEP APNEA: Primary | ICD-10-CM

## 2024-12-19 DIAGNOSIS — Z72.820 SLEEP DEFICIENT: ICD-10-CM

## 2024-12-19 DIAGNOSIS — R06.83 SNORING: ICD-10-CM

## 2024-12-19 DIAGNOSIS — F41.9 ANXIETY: ICD-10-CM

## 2024-12-19 DIAGNOSIS — Z72.821 INADEQUATE SLEEP HYGIENE: ICD-10-CM

## 2024-12-19 DIAGNOSIS — F90.9 ATTENTION DEFICIT HYPERACTIVITY DISORDER (ADHD), UNSPECIFIED ADHD TYPE: ICD-10-CM

## 2024-12-19 PROCEDURE — G0463 HOSPITAL OUTPT CLINIC VISIT: HCPCS

## 2024-12-19 NOTE — PROGRESS NOTES
[facility]  [facility]  [address]  [place] [address]  Phone   Fax       Edith Ward  [id number]   2005  19 y.o.  female      Referring physician/provider and  PCP Lavell More DO    Type of service: Initial Sleep Medicine Consult.  Date of service: 12/19/2024      Chief Complaint   Patient presents with    Snoring    Fatigue       History of present illness;  The patient was seen today on 12/19/2024 at [facility].    Thank you for asking to see Edith Ward PMHx ADHD (on Adderall past month she has been taking 20 mg when she wakes up 7 am to noon - plans on starting 15 mg when she wakes up and a second dose of 15 mg dose in the afternoon - states this is new hasn't started has currently [facility] and Vyvanse 40 mg takes from 7 am to noon), anxiety (on lexapro qam managed by behavioral health), hypermobile Bambi-Danlos syndrome, episodes of trembling (followed by neurology - last episode 1 year ago not on AED - states neurology cleared her for no further follow ups), POTS.   The patient presents for initial evaluation of sleep disordered breathing.  Patient  endorses prior surgery namely tonsillectomy, but denies: nasal surgery or UPPP.     States she's been told she snores - by her sibling / parents  (not recently though currently living in dorms doesn't have a dorm mate) the snoring in the past disrupted room partners  Never wakes up gasping for air   Possible witnessed apnea in the past currently uncertain no room mate no bed partner  No issues falling asleep or staying asleep  Admits to not prioritizing sleep and self restricting receptive to counseling    Her mother snores loud   Her father has sleep apnea     Obstructive Sleep Apnea Screening: STOP-BANG Sleep Apnea Questionnaire. Reference: Ledezma F et al. Br J Anaesth, 2012.     Criterion    Yes    No  Do you SNORE loudly?   [x]   Yes  []   No    Do you often feel TIRED, fatigued, or sleepy during the day?    [x]   Yes  []   No  +fatigue epworth normal 7/24  Has anyone OBSERVED you stop breathing during your sleep?    [x]   Yes  []   No  Do you have or are you being treated for high blood PRESSURE?    []   Yes  [x]   No  BMI >32 kg/m2     []   Yes  [x]   No  AGE > 50 years    []   Yes  [x]   No  NECK circumference >16 inches / 40 cm    []   Yes  [x]   No  GENDER: male     []   Yes  [x]   No    YADIRA Probability:  []   1-2 - Low  [x]   3-4 - Intermediate  []   5-8 - High    Save what is noted above in HPI, denies any OTHER past known:  cardiopulmonary conditions/neurologic disorders/neuromuscular disorders  Never needed supplemental O2 at home  Denies any opioid therapy  Denies any metal in head/neck/chest      Further Sleep History:    Bedtime: 10 pm to 2 am    Rise Time: 7 am to noon  She self restricts her sleep some nights to less than 7 hours nightly sleep tries to catch up on sleep (we reviewed how this will not be effective - she has goals to prioritize her sleep)  School is contributing to sleep deficiency she knows she needs to prioritize her sleep and no issues falling asleep  Sleep Latency: less than 20 minutes to more than 20 min usually 30 minutes on phone  Screens in bed: many things to include reading and scrolling on social media  She admits to sleep deficiency inconsistent bed time and inconsistent rise time   Scrolling on phone she states she was an ipad child long term habit she knows she needs to address  Has an clifford called Extenda-Dent which helps her wake up in the morning it's working best for her   Has never had CBTi   Wake after sleep onset: twice  Reasons for awakenings: nocturia  Number of naps per day denies  Naps restorative: n/a  Caffeine use: 2 beverages/d  Sudden recurrent irrepressible desire for sleep: no  RLS Symptoms: No   Bruxism:No   Current sleep related gastroesophageal reflux symptoms:  No   Cataplexy:  No   Sleep Paralysis:  " No   Hypnagogic or hypnopompic hallucinations: No   Parasomnias such as sleep walking or sleep eating No     Disclaimer Sleep History: The above sleep history is based on this sleep physician's in room encounter with the patient. Pre encounter self administered questionnaires are taken into consideration and discussed with patient for any discordance. The above documentation by this sleep physician is the most accurate clinical information determined by in room sleep physician encounter with patient.     MEDICAL CONDITIONS (PMH)   ADHD  Anxiety  OCD  Ehler Danos  POTS  Scoliosis    Social history:  Do you drive a commercial vehicle:  No   Shift work:  No   Tobacco use:  No   Alcohol use: 0 per week  Occupation: student    Family Hx (parents and siblings) (pertaining to sleep medicine)  Sleep apnea  Obesity    Medications: reviewed    Review of systems is negative unless otherwise noted per HPI   Disclaimer History: The above history is based on this sleep physician's in room encounter with the patient. Pre encounter self administered questionnaires are taken into consideration and discussed with patient for any discordance. The above documentation by this sleep physician is the most accurate clinical information determined by in room sleep physician encounter with patient.     Physical exam:  Vitals:    12/19/24 1300   BP: 133/82   Pulse: 95   SpO2: 100%   Weight: 71.2 kg (157 lb)   Height: 175.3 cm (69\")    Body mass index is 23.18 kg/m².   CONSTITUTIONAL:  Non-toxic, In no overt distress   Head: normocephalic   ENT: Mallampati class II (x5 visualizations),  macroglossia without tongue ridging, no septal defects   NECK:Neck Circumference: 13 inches,no nuchal rigidity  RESPIRATORY SYSTEM: Breath sounds are clear (no rales, no rhonchi, no wheezes), no accessory muscle use  CARDIOVASULAR SYSTEM: Heart sounds are regular rhythm and normal rate, no rub, no gallop, no edema  NEUROLOGICAL SYSTEM: Oriented x 3, No gross " focal deficits   PSYCHIATRIC SYSTEM:  Pleasant, Goal oriented, affect full range appropriate      Office notes from care team reviewed:      11/6/2024 PCP telemedicine visit Inconsistent bedtime staying up past 2 am. Finding it difficult to wake up in the morning. Counseled to prioritize her sleep  -9/3/2024 patient sent the message to her PCP with request for sleep study and referral for sleep medicine placed by patient's PCP  -8/7/2024 PCP-reviewed BMI 25.24 kg/m²  -3/20/2024 PCP visit notes reviewed the patient's BMI at this visit was 26.57 kg/m² there was a concern for COVID-19 virus infection  -12/27/2022 cardiology clinic notes reviewed seen for lightheadedness/tachycardia/episodes of trembling  -12/9/2022 neurology documentation by me evaluate for spells of trembling  -11/14/2022 neurology notes for spells of trembling  12/7/2022 cardiology notes reviewed  -10/10/2022 PCP notes reviewed    Labs reviewed.        - 8/7/2024  Bicarb 24 H&H 13.5/41.5  MCV 91    Imaging/Diagnostics reviewed:     - 12/19/2022 EEG neurology's impression:This is a technically complicated EEG because of muscle artifact through large portions of the record.  Otherwise, there were no diagnostic abnormalities in this awake EEG.s    - 12/9/2022 MRI brain without contrast radiologist's impression: Essentially normal noncontrast MRI brain. Given that the provided history is seizure like activity, recommend correlation with postcontrast imaging.    Assessment and plan:  Suspected sleep apnea [R29.818] patient's symptoms and examination is consistent with sleep apnea (G47.30). I have talked to the patient about the signs and symptoms of sleep apnea. In addition, I have also discussed pathophysiology of sleep apnea.  I also discussed the complications of untreated sleep apnea including effects on hypertension, diabetes mellitus and nonrestorative sleep with hypersomnia which can increase risk for motor vehicle accidents.  Untreated sleep apnea  is also a risk factor for development of atrial fibrillation, hypertension, insulin resistance and cerebrovascular accident.  Discussed in detail of various testing methods including home-based and lab based sleep studies.  Based on history and physical examination and other comorbidities the most appropriate study is Home Sleep Study.  The order for the sleep study is placed in [facility].  The test will be scheduled after approval from insurance. Treatment and management will be discussed after the test is completed.  Patient was given opportunity to ask questions and all the questions were answered.     -With her clinical history and family hx father with sleep apnea and mother loud snoring fair to say home sleep study to evaluate for sleep apnea to rule in or rule out for sleep disordered breathing.  -She's going back to College in [place] I did advise her to see her [facility] if she can't make it back [facility] that typically [facility]   -I did make her aware potential requirements for in visit follow ups in the future if placed on PAP especially with compliance requirement visits she stated she would potentially be able to accommodate as she often drives home on the weekend ( I did make her office not open on the weekend - she can see me at any [facility] to or see another [facility] if conflicts with her school schedule and days that I'm not here in [place])  -If sleep study negative follow up with PCP for fatigue    Snoring (R06.83), snoring is the sound created by turbulent airflow vibrating upper airway soft tissue due to limitation of inspiratory airflow. I have also discussed factors affecting snoring including sleep deprivation, sleeping on the back and alcohol ingestion. To minimize snoring, patient is advised to have adequate sleep, sleep on the side and avoid alcohol and sedative medications before bedtime  Normal BMI, patient's BMI is  Body mass index is 23.18 kg/m².. Weight loss/weight gain not indicated from sleep disorders perspective. Follow up with PCP for preventative care.  Inadequate sleep hygiene [Z67.523] She has multiple inadequate circumstances of sleep that preclude a diagnosis of insomnia. I provided her stimulus control counseling as very receptive to counseling and will have benefit from same: Maintain a regular bedtime and wake time, not to watch television or use screens in bed, limit caffeine-containing beverages before bed time and avoid naps during the day, reserve bed for intimacy or sleep only.  If patient encounters 20 minutes or more in bed without sleep, then instructed to get out of bed to chair, in dimly lit or dark area, pursue a boring/non-stimulating activity, and return to bed only when sleepy (repeat this step as often as necessary). Follow up with PCP to reinforce healthy lifestyle modifications  -She will also have great benefit from CBT-I to address sleep hygiene and multiple inadequate circumstances of sleep   -Referred patient to psychology for CBT-I. Encouraged patient to do research and contact their personal insurance for a sleep psychologist. I provided patient with the following sleep psychologist's information for CBTi who also provides telehealth services - ideal for telehealth which she was happy to hear as she goes to college in [place]: Dr. Lavell Rubalcava's (PsyD, [facility])   [place]  Address: [address] [place] [address], [place] [address]  Phone: [phone]  Sleep Deficient [U88.698]. Counseled patient ASM recommendation for adults should sleep 7 or more hours per night on a regular basis to promote optimal health, productivity and daytime alertness.  Healthy sleep reduces the risk of drowsy driving, workplace accidents, mental health problems such as depression, obesity and medical conditions such as heart disease and type 2 diabetes.  Counseled patient to prioritize sleep, reviewed with patient  appropriate sleep hygiene/stimulus control counseling. She was very receptive to counseling knows she needs to prioritize 7 hours consistent nightly sleep catch up on weekends will not work. She has great potential to benefit from CBTi as stated above I referred her to CBTi.  Anxiety,  Follow up with primary care physician for continued management. This medical condition would make the patient eligible for a trial of PAP therapy even if sleep study reveals mild severity sleep apnea.  ADHD, On a great deal of stimulant therapy that can contribute to sleep disturbances. Counseled to follow up with prescribing clinician to discuss risks/benefits of stimulant therapy. I will want her to hold afternoon stimulant the day of any sleep study for sleep study as she hasn't started this yet, she can resume as prescribed after sleep study day.     I have also discussed with the patient the following  Sleep hygiene: Maintaining a regular bedtime and wake time, not to watch television or work in bed, limit caffeine-containing beverages before bed time and avoid naps during the day  Adequate amount of sleep.  Generally most people needs about 7 to 8 hours of sleep.       Patient's questions were answered      I once again thank you for asking me to see this patient in consultation and I have forwarded my opinion and treatment plan.  Please do not hesitate to call me if you have any questions.       Time based visit 110 minutes: to include in room history/exam/extensive counseling/review of plan with patient/review of labs/review of medications/review of diagnostics/independent conclusions drawn from prior diagnostics/ review of prior medical records/ complex medical decision making leading to order for HST order / Referral to sleep psychologist CBTi        EMR Dragon/Transcription disclaimer:   Much of this encounter note is an electronic transcription/translation of spoken language to printed text. The electronic translation of  spoken language may permit erroneous, or at times, nonsensical words or phrases to be inadvertently transcribed; Although I have reviewed the note for such errors, some may still exist.     NPI #: [id number]    Carlos Paz,   Sleep Medicine  [facility]  12/19/24

## 2025-01-02 ENCOUNTER — HOSPITAL ENCOUNTER (OUTPATIENT)
Dept: SLEEP MEDICINE | Facility: HOSPITAL | Age: 20
Discharge: HOME | End: 2025-01-02
Admitting: FAMILY MEDICINE
Payer: COMMERCIAL

## 2025-01-02 DIAGNOSIS — R29.818 SUSPECTED SLEEP APNEA: ICD-10-CM

## 2025-01-02 DIAGNOSIS — R06.81 WITNESSED EPISODE OF APNEA: ICD-10-CM

## 2025-01-02 DIAGNOSIS — R06.83 SNORING: ICD-10-CM

## 2025-01-02 PROCEDURE — G0399 HOME SLEEP TEST/TYPE 3 PORTA: HCPCS

## 2025-01-17 ENCOUNTER — DOCUMENTATION (OUTPATIENT)
Dept: SLEEP MEDICINE | Facility: HOSPITAL | Age: 20
End: 2025-01-17
Payer: COMMERCIAL

## 2025-01-17 DIAGNOSIS — G47.33 OBSTRUCTIVE SLEEP APNEA, ADULT: Primary | ICD-10-CM

## 2025-01-17 DIAGNOSIS — G47.34 SLEEP RELATED HYPOXIA: ICD-10-CM

## 2025-01-17 NOTE — PROGRESS NOTES
Date 1/17/2025      I reviewed this patient's HST many times.  Clinically correlated this patient absolutely has obstructive sleep apnea with objective findings on out of center sleep test see my HST interpretation.   With each review of her HST I was able to as find more obstructive apneic events by my clinical judgment and objective findings on out of center sleep study.      Apparently [facility] decicded to finalize the HST interpretation without notifying me.  My HST is intepertation is correct as stands.    After several reviews of her HST as well as 12/19/2024 sleep clinic notes      I have ordered her a titration study that can proceed per protocol with the following recommendations:  I have made a note on order comments to our PSG technicians  She may potentially benefit from nasal mask positive airway pressure titration.  I would prefer we try a DreamWear nasal or AirFit N30i if we have in stock on the night of titration.  I will also allow our PSG technicians to help her decide on a good mask for the night of the titration with her.  The titratoin can proceed per protocol for any concerns.     Plans as previous proceed to scheduling for positive airway pressure titration to guide management of sleep disordered breathing.       NPI #: [id number]    Carlos Paz, DO  Sleep Medicine  [facility]  01/17/25

## 2025-01-20 ENCOUNTER — DOCUMENTATION (OUTPATIENT)
Dept: SLEEP MEDICINE | Facility: HOSPITAL | Age: 20
End: 2025-01-20
Payer: COMMERCIAL

## 2025-01-20 NOTE — PROGRESS NOTES
Pt/s VM is full:  Pt has been scheduled for a inlab sleep study in [place] 2/27/2025.  Paperwork has been sent out by mail to the patient.

## 2025-01-23 DIAGNOSIS — F90.0 ATTENTION DEFICIT HYPERACTIVITY DISORDER (ADHD), PREDOMINANTLY INATTENTIVE TYPE: ICD-10-CM

## 2025-01-24 RX ORDER — LISDEXAMFETAMINE DIMESYLATE 40 MG/1
40 CAPSULE ORAL EVERY MORNING
Qty: 30 CAPSULE | Refills: 0 | Status: SHIPPED | OUTPATIENT
Start: 2025-01-24

## 2025-03-08 DIAGNOSIS — F90.0 ATTENTION DEFICIT HYPERACTIVITY DISORDER (ADHD), PREDOMINANTLY INATTENTIVE TYPE: ICD-10-CM

## 2025-03-11 RX ORDER — LISDEXAMFETAMINE DIMESYLATE 40 MG/1
40 CAPSULE ORAL EVERY MORNING
Qty: 30 CAPSULE | Refills: 0 | Status: SHIPPED | OUTPATIENT
Start: 2025-03-11

## 2025-03-11 RX ORDER — DEXTROAMPHETAMINE SACCHARATE, AMPHETAMINE ASPARTATE, DEXTROAMPHETAMINE SULFATE AND AMPHETAMINE SULFATE 7.5; 7.5; 7.5; 7.5 MG/1; MG/1; MG/1; MG/1
30 TABLET ORAL DAILY
Qty: 30 TABLET | Refills: 0 | Status: SHIPPED | OUTPATIENT
Start: 2025-03-11

## 2025-03-12 DIAGNOSIS — F90.0 ATTENTION DEFICIT HYPERACTIVITY DISORDER (ADHD), PREDOMINANTLY INATTENTIVE TYPE: ICD-10-CM

## 2025-03-13 RX ORDER — LISDEXAMFETAMINE DIMESYLATE 40 MG/1
40 CAPSULE ORAL EVERY MORNING
Qty: 30 CAPSULE | Refills: 0 | OUTPATIENT
Start: 2025-03-13

## 2025-03-13 RX ORDER — DEXTROAMPHETAMINE SACCHARATE, AMPHETAMINE ASPARTATE, DEXTROAMPHETAMINE SULFATE AND AMPHETAMINE SULFATE 7.5; 7.5; 7.5; 7.5 MG/1; MG/1; MG/1; MG/1
30 TABLET ORAL DAILY
Qty: 30 TABLET | Refills: 0 | OUTPATIENT
Start: 2025-03-13

## 2025-03-21 DIAGNOSIS — G47.19 EXCESSIVE DAYTIME SLEEPINESS: ICD-10-CM

## 2025-03-21 DIAGNOSIS — G47.33 OBSTRUCTIVE SLEEP APNEA, ADULT: Primary | ICD-10-CM

## 2025-03-21 DIAGNOSIS — Z72.821 INADEQUATE SLEEP HYGIENE: ICD-10-CM

## 2025-03-21 DIAGNOSIS — Z72.820 SLEEP DEFICIENT: ICD-10-CM

## 2025-03-24 ENCOUNTER — TELEPHONE (OUTPATIENT)
Dept: SLEEP MEDICINE | Facility: HOSPITAL | Age: 20
End: 2025-03-24
Payer: COMMERCIAL

## 2025-03-24 NOTE — TELEPHONE ENCOUNTER
Pt has been called to be given sleep study results.  Pt's Vmbox is full.  Pt has been scheduled for a follow up and compliance scheduled for 5/23/2025.  Pt's new patient therapy device and supply order has been sent over to [facility].    Pt's inlab has been cancelled and cpap order has been written

## 2025-04-02 ENCOUNTER — PATIENT MESSAGE (OUTPATIENT)
Dept: FAMILY MEDICINE CLINIC | Facility: CLINIC | Age: 20
End: 2025-04-02
Payer: COMMERCIAL

## 2025-04-03 ENCOUNTER — TELEPHONE (OUTPATIENT)
Dept: FAMILY MEDICINE CLINIC | Facility: CLINIC | Age: 20
End: 2025-04-03
Payer: COMMERCIAL

## 2025-04-03 NOTE — LETTER
[facility]  [address]  [place] [address]  [phone]  Dept: [phone]    25    Patient Name:  Edith Ward  :  2005    To Whom It May Concern:      Edith is in treatment with us for Attention Deficit Hyperactivity Disorder, generalized anxiety disorder, OCD, and scoliosis. She is maintained on medication and physical and mental health therapy.    These disorders adversely affects the educational performance. This impairment means having limited strength, and vitality, including a heightened awareness to environmental stimuli, that result in limited alertness with respect to the educational environment.  These are chronic health problems.    I am recommending that Edith receive accommodations in the classroom along with behavioral interventions.  If I can be of further assistance, please feel free to call me.    Sincerely,    Lavell More DO  Electronically signed by Lavell More DO on 2025

## 2025-04-03 NOTE — TELEPHONE ENCOUNTER
Edith Ward to [facility] Clinical Pool (supporting Lavell More DO) (Selected Message)        4/2/25  2:32 PM  Hi! I'm hoping you could help me out by emailing me some medical documentation confirming my mental (ADHD, OCD, Anxiety, etc) physical (HEDs,  Scoliosis, POTS, ect) issues. 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 Thanks for the help! My email is: [email or web address]

## 2025-04-08 PROBLEM — F41.9 ANXIETY: Status: ACTIVE | Noted: 2025-04-08

## 2025-04-08 PROBLEM — F32.9 MAJOR DEPRESSIVE DISORDER, SINGLE EPISODE, UNSPECIFIED: Status: ACTIVE | Noted: 2025-04-08

## 2025-04-22 DIAGNOSIS — F90.0 ATTENTION DEFICIT HYPERACTIVITY DISORDER (ADHD), PREDOMINANTLY INATTENTIVE TYPE: ICD-10-CM

## 2025-04-23 RX ORDER — LISDEXAMFETAMINE DIMESYLATE 40 MG/1
40 CAPSULE ORAL EVERY MORNING
Qty: 30 CAPSULE | Refills: 0 | Status: SHIPPED | OUTPATIENT
Start: 2025-04-23

## 2025-05-06 DIAGNOSIS — F90.0 ATTENTION DEFICIT HYPERACTIVITY DISORDER (ADHD), PREDOMINANTLY INATTENTIVE TYPE: ICD-10-CM

## 2025-05-07 RX ORDER — LISDEXAMFETAMINE DIMESYLATE 40 MG/1
40 CAPSULE ORAL EVERY MORNING
Qty: 30 CAPSULE | Refills: 0 | OUTPATIENT
Start: 2025-05-07

## 2025-05-07 RX ORDER — DEXTROAMPHETAMINE SACCHARATE, AMPHETAMINE ASPARTATE, DEXTROAMPHETAMINE SULFATE AND AMPHETAMINE SULFATE 7.5; 7.5; 7.5; 7.5 MG/1; MG/1; MG/1; MG/1
30 TABLET ORAL DAILY
Qty: 30 TABLET | Refills: 0 | OUTPATIENT
Start: 2025-05-07

## 2025-05-19 DIAGNOSIS — F90.0 ATTENTION DEFICIT HYPERACTIVITY DISORDER (ADHD), PREDOMINANTLY INATTENTIVE TYPE: ICD-10-CM

## 2025-05-20 RX ORDER — LISDEXAMFETAMINE DIMESYLATE 40 MG/1
40 CAPSULE ORAL EVERY MORNING
Qty: 30 CAPSULE | Refills: 0 | OUTPATIENT
Start: 2025-05-20

## 2025-05-20 RX ORDER — DEXTROAMPHETAMINE SACCHARATE, AMPHETAMINE ASPARTATE, DEXTROAMPHETAMINE SULFATE AND AMPHETAMINE SULFATE 7.5; 7.5; 7.5; 7.5 MG/1; MG/1; MG/1; MG/1
30 TABLET ORAL DAILY
Qty: 30 TABLET | Refills: 0 | OUTPATIENT
Start: 2025-05-20

## 2025-06-02 ENCOUNTER — OFFICE VISIT (OUTPATIENT)
Age: 20
End: 2025-06-02

## 2025-06-02 VITALS
HEIGHT: 69 IN | WEIGHT: 160 LBS | DIASTOLIC BLOOD PRESSURE: 65 MMHG | TEMPERATURE: 97.3 F | RESPIRATION RATE: 16 BRPM | HEART RATE: 59 BPM | OXYGEN SATURATION: 98 % | BODY MASS INDEX: 23.7 KG/M2 | SYSTOLIC BLOOD PRESSURE: 110 MMHG

## 2025-06-02 DIAGNOSIS — J02.9 ACUTE VIRAL PHARYNGITIS: Primary | ICD-10-CM

## 2025-06-02 DIAGNOSIS — B34.9 ACUTE VIRAL SYNDROME: ICD-10-CM

## 2025-06-02 DIAGNOSIS — R05.1 ACUTE COUGH: ICD-10-CM

## 2025-06-02 LAB
INFLUENZA A ANTIGEN, POC: NORMAL
INFLUENZA B ANTIGEN, POC: NORMAL
Lab: NORMAL
QC PASS/FAIL: NORMAL
S PYO AG THROAT QL: NORMAL
SARS-COV-2, POC: NORMAL

## 2025-06-02 RX ORDER — ESCITALOPRAM OXALATE 20 MG/1
20 TABLET ORAL DAILY
COMMUNITY

## 2025-06-02 RX ORDER — DEXTROMETHORPHAN HYDROBROMIDE, GUAIFENESIN AND PSEUDOEPHEDRINE HYDROCHLORIDE 15; 400; 60 MG/1; MG/1; MG/1
1 TABLET ORAL 2 TIMES DAILY
Qty: 20 TABLET | Refills: 0 | Status: SHIPPED | OUTPATIENT
Start: 2025-06-02

## 2025-06-02 RX ORDER — METHYLPREDNISOLONE 4 MG/1
TABLET ORAL
Qty: 1 KIT | Refills: 0 | Status: SHIPPED | OUTPATIENT
Start: 2025-06-02

## 2025-06-02 ASSESSMENT — ENCOUNTER SYMPTOMS
NAUSEA: 0
EYE REDNESS: 0
SINUS PAIN: 1
ABDOMINAL PAIN: 0
EYE PAIN: 0
VOICE CHANGE: 0
VOMITING: 0
COUGH: 1
TROUBLE SWALLOWING: 0
SORE THROAT: 1
COLOR CHANGE: 0
CHEST TIGHTNESS: 0
SHORTNESS OF BREATH: 0
DIARRHEA: 0
BACK PAIN: 0
SINUS PRESSURE: 1
CONSTIPATION: 0

## 2025-06-02 NOTE — PROGRESS NOTES
Francie Merlos (:  2005) is a 19 y.o. female,New patient, here for evaluation of the following chief complaint(s):  Cold Symptoms (X 1 day )                SUBJECTIVE/OBJECTIVE:    Cold Symptoms   Associated symptoms include congestion, coughing, sinus pain and a sore throat. Pertinent negatives include no abdominal pain, chest pain, diarrhea, dysuria, ear pain, headaches, nausea, neck pain, rash or vomiting.       Sore throat and runny nose that started last night.  Covid, flu, and strep are all negative.    Vitals:    25 0815   BP: 110/65   BP Site: Right Upper Arm   Patient Position: Sitting   BP Cuff Size: Large Adult   Pulse: 59   Resp: 16   Temp: 97.3 °F (36.3 °C)   TempSrc: Axillary   SpO2: 98%   Weight: 72.6 kg (160 lb)   Height: 1.753 m (5' 9\")       Review of Systems   Constitutional:  Positive for activity change, appetite change and chills. Negative for fever and unexpected weight change.   HENT:  Positive for congestion, sinus pressure, sinus pain and sore throat. Negative for ear pain, hearing loss, tinnitus, trouble swallowing and voice change.    Eyes:  Negative for pain, redness and visual disturbance.   Respiratory:  Positive for cough. Negative for chest tightness and shortness of breath.    Cardiovascular:  Negative for chest pain, palpitations and leg swelling.   Gastrointestinal:  Negative for abdominal pain, constipation, diarrhea, nausea and vomiting.   Endocrine: Negative for cold intolerance, heat intolerance, polydipsia, polyphagia and polyuria.   Genitourinary:  Negative for difficulty urinating, dysuria, frequency and urgency.        No Vaginal bleeding or discharge    Musculoskeletal:  Negative for back pain, gait problem and neck pain.   Skin:  Negative for color change and rash.   Allergic/Immunologic: Negative for immunocompromised state.   Neurological:  Negative for tremors, seizures, syncope, speech difficulty, weakness and headaches.   Hematological:

## 2025-06-05 ENCOUNTER — TELEPHONE (OUTPATIENT)
Dept: FAMILY MEDICINE CLINIC | Facility: CLINIC | Age: 20
End: 2025-06-05
Payer: COMMERCIAL

## 2025-06-05 NOTE — TELEPHONE ENCOUNTER
Father stopped by the office to find out abut Mollys medication. I spoke with the pharm. Her RX is at the pharm. Adderall was to soon to fill and they will fill.     Vyvanse is on backorder. The 40 mg and 20 mg is backorder.

## 2025-06-12 ENCOUNTER — OFFICE VISIT (OUTPATIENT)
Dept: SLEEP MEDICINE | Facility: HOSPITAL | Age: 20
End: 2025-06-12
Payer: COMMERCIAL

## 2025-06-12 VITALS
HEIGHT: 69 IN | SYSTOLIC BLOOD PRESSURE: 124 MMHG | HEART RATE: 82 BPM | OXYGEN SATURATION: 97 % | BODY MASS INDEX: 22.51 KG/M2 | WEIGHT: 152 LBS | DIASTOLIC BLOOD PRESSURE: 72 MMHG

## 2025-06-12 DIAGNOSIS — G47.419 NARCOLEPSY WITHOUT CATAPLEXY: ICD-10-CM

## 2025-06-12 DIAGNOSIS — G47.20 CIRCADIAN RHYTHM DISORDER: ICD-10-CM

## 2025-06-12 DIAGNOSIS — F51.9 SEVERE MORNING SLEEP INERTIA: ICD-10-CM

## 2025-06-12 DIAGNOSIS — G47.10 HYPERSOMNIA: Primary | ICD-10-CM

## 2025-06-12 PROCEDURE — G0463 HOSPITAL OUTPT CLINIC VISIT: HCPCS

## 2025-06-12 NOTE — PROGRESS NOTES
[facility] Note       Primary Care Physician: Lavell More DO    Interval History:   The patient is a 19 y.o. female      History of Present Illness  The patient presents for evaluation of sleepiness.    She has been experiencing excessive sleepiness since her middle school years, as long as she can remember. Her father had similar symptoms and was diagnosed with sleep apnea. She reports no other family members with similar sleep issues. She has undergone a home sleep apnea test, the results of which are currently unknown to her. Despite being prescribed a CPAP machine, she has not been using it consistently and has not noticed any significant changes when she does use it. She has been on Adderall, which she takes an hour before waking up, but has not taken it for about a month due to supply issues. She does not have Vyvanse. She reports no feelings of weakness during emotional or startling events. She attempts to maintain a regular sleep schedule but struggles to wake up to alarms and often does not feel tired at bedtime. She has tried various types of melatonin without success. Her response to napping varies, sometimes feeling refreshed and other times finding it difficult to wake up. She has tried different alarm clocks, including one with a sensor that vibrates the bed, but these have only been effective for about a week. She recalls an instance where she slept for 15 to 18 hours straight. She is currently taking escitalopram and is concerned about potential dizziness if she discontinues it for the sleep study. She also has a diagnosis of POTS.    FAMILY HISTORY  Her father had sleep apnea.    MEDICATIONS  Current: Adderall, escitalopram         Downloaded PAP Data Evaluated For Therapeutic Response and Compliance: Using her device currently.    DME is [employer].      Review of Systems:    A complete review of systems was done and all were negative with the exception of sleep inertia and  "morning that sleepiness    Social History:    Social History     Socioeconomic History    Marital status: Single   Tobacco Use    Smoking status: Never     Passive exposure: Never    Smokeless tobacco: Never   Vaping Use    Vaping status: Never Used   Substance and Sexual Activity    Alcohol use: Never    Drug use: Never    Sexual activity: Defer       Allergies:  Patient has no known allergies.     Medication Review:  Reviewed.      Vital Signs:    Vitals:    06/12/25 1500   BP: 124/72   Pulse: 82   SpO2: 97%   Weight: 68.9 kg (152 lb)   Height: 175.3 cm (69.02\")     Body mass index is 22.44 kg/m².  .BMIFOLLOWUP    Physical Exam:    Constitutional:  Well developed 19 y.o. female that appears in no apparent distress.  Awake & oriented times 3.  Normal mood with normal recent and remote memory and normal judgement.  Eyes:  Conjunctivae normal.      Self-administered [place] Sleepiness Scale test results: 5  0-5 Lower normal daytime sleepiness  6-10 Higher normal daytime sleepiness  11-12 Mild, 13-15 Moderate, & 16-24 Severe excessive daytime sleepiness       I have reviewed the above results and compared them with the patient's last downloads and reviewed with the patient.    Impression:     Encounter Diagnoses   Name Primary?    Hypersomnia Yes    Circadian rhythm disorder     Severe morning sleep inertia     Narcolepsy without cataplexy          Assessment & Plan  1. Hypersomnia.  She reports difficulty waking up, inconsistent use of CPAP, and a history of prolonged sleep episodes. There is no evidence of sleep apnea. An in-lab polysomnogram followed by a multiple sleep latency test will be conducted to classify her hypersomnia. A urine drug screen will be performed to ensure no substances are affecting the sleep study. She is advised to discontinue Adderall and Vyvanse for at least a week and escitalopram for 1 to 2 weeks prior to the study to avoid interference with REM sleep assessment.      I am skeptical that " she has significant sleep apnea .    I have ordered polysomnogram sleep study which is more accurate than a home sleep test and then if she does not meet criteria for sleep apnea we will proceed with an MSLT to assess her hypersomnia.         Good sleep hygiene measures should be maintained.  I answered all of the patient's questions.  The patient will call the [facility] for any problems and will follow up after her PSG/MSLT.    Patient or patient representative verbalized consent for the use of Ambient Listening during the visit with  Rey Sood MD for chart documentation. 6/12/2025  16:17 EDT           Rey Sood MD  Sleep Medicine  06/12/25  16:17 EDT

## 2025-06-30 ENCOUNTER — HOSPITAL ENCOUNTER (OUTPATIENT)
Dept: SLEEP MEDICINE | Facility: HOSPITAL | Age: 20
Discharge: HOME | End: 2025-06-30
Admitting: PSYCHIATRY & NEUROLOGY
Payer: COMMERCIAL

## 2025-06-30 DIAGNOSIS — G47.10 HYPERSOMNIA: ICD-10-CM

## 2025-06-30 DIAGNOSIS — F51.9 SEVERE MORNING SLEEP INERTIA: ICD-10-CM

## 2025-06-30 DIAGNOSIS — G47.419 NARCOLEPSY WITHOUT CATAPLEXY: ICD-10-CM

## 2025-06-30 DIAGNOSIS — G47.20 CIRCADIAN RHYTHM DISORDER: ICD-10-CM

## 2025-06-30 PROCEDURE — 95810 POLYSOM 6/> YRS 4/> PARAM: CPT

## 2025-07-01 ENCOUNTER — HOSPITAL ENCOUNTER (OUTPATIENT)
Dept: SLEEP MEDICINE | Facility: HOSPITAL | Age: 20
Discharge: HOME | End: 2025-07-01
Admitting: PSYCHIATRY & NEUROLOGY
Payer: COMMERCIAL

## 2025-07-01 DIAGNOSIS — G47.20 CIRCADIAN RHYTHM DISORDER: ICD-10-CM

## 2025-07-01 DIAGNOSIS — G47.10 HYPERSOMNIA: ICD-10-CM

## 2025-07-01 DIAGNOSIS — G47.419 NARCOLEPSY WITHOUT CATAPLEXY: ICD-10-CM

## 2025-07-01 DIAGNOSIS — F51.9 SEVERE MORNING SLEEP INERTIA: ICD-10-CM

## 2025-07-01 LAB
AMPHET+METHAMPHET UR QL: NEGATIVE
AMPHETAMINES UR QL: NEGATIVE
BARBITURATES UR QL SCN: NEGATIVE
BENZODIAZ UR QL SCN: NEGATIVE
BUPRENORPHINE SERPL-MCNC: NEGATIVE NG/ML
CANNABINOIDS SERPL QL: NEGATIVE
COCAINE UR QL: NEGATIVE
METHADONE UR QL SCN: NEGATIVE
OPIATES UR QL: NEGATIVE
OXYCODONE UR QL SCN: NEGATIVE
PCP UR QL SCN: NEGATIVE
TRICYCLICS UR QL SCN: NEGATIVE

## 2025-07-01 PROCEDURE — 95805 MULTIPLE SLEEP LATENCY TEST: CPT

## 2025-07-01 PROCEDURE — 80306 DRUG TEST PRSMV INSTRMNT: CPT | Performed by: PSYCHIATRY & NEUROLOGY

## 2025-07-10 ENCOUNTER — OFFICE VISIT (OUTPATIENT)
Dept: SLEEP MEDICINE | Facility: HOSPITAL | Age: 20
End: 2025-07-10
Payer: COMMERCIAL

## 2025-07-10 VITALS
HEIGHT: 69 IN | HEART RATE: 109 BPM | BODY MASS INDEX: 22.44 KG/M2 | SYSTOLIC BLOOD PRESSURE: 104 MMHG | OXYGEN SATURATION: 98 % | DIASTOLIC BLOOD PRESSURE: 71 MMHG

## 2025-07-10 DIAGNOSIS — F51.9 SEVERE MORNING SLEEP INERTIA: ICD-10-CM

## 2025-07-10 DIAGNOSIS — G47.411 NARCOLEPSY WITH CATAPLEXY: Primary | ICD-10-CM

## 2025-07-10 PROCEDURE — G0463 HOSPITAL OUTPT CLINIC VISIT: HCPCS

## 2025-07-10 NOTE — PROGRESS NOTES
[facility] Note       Primary Care Physician: Lavell More, DO    Interval History:   The patient is a 19 y.o. female      History of Present Illness  The patient presents for evaluation of narcolepsy with cataplexy.    She expresses relief at having a diagnosis but is concerned about the potential need for medication to help her wake up in the morning. She experiences sleep paralysis, which she finds distressing. She also struggles with falling asleep, which she attributes to her current medications. During a week-long break from her medications, she was surprised to find that she fell asleep all five times she attempted to do so. She is currently taking Adderall and Lexapro and was prescribed Vyvanse, but due to supply issues, she has not been able to take it. She had considered getting a service animal to assist her but was unable to train her dog for this purpose. She has already dropped her classes and is seeking a letter confirming her symptoms and diagnosis to support her application for a refund. Her primary care physician is Dr. Lavell More.    SOCIAL HISTORY  She is a student.    FAMILY HISTORY  Her father had a similar sleep pattern and has sleep apnea.    MEDICATIONS  Current: Adderall, Lexapro  Discontinued: Vyvanse         Downloaded PAP Data Evaluated For Therapeutic Response and Compliance:      Review of Systems:    A complete review of systems was done and all were negative with the exception of her biggest complaint    Social History:    Social History     Socioeconomic History    Marital status: Single   Tobacco Use    Smoking status: Never     Passive exposure: Never    Smokeless tobacco: Never   Vaping Use    Vaping status: Never Used   Substance and Sexual Activity    Alcohol use: Never    Drug use: Never    Sexual activity: Defer       Allergies:  Patient has no known allergies.     Medication Review:  Reviewed.      Vital Signs:    Vitals:    07/10/25 1300   BP:  "104/71   Pulse: 109   SpO2: 98%   Height: 175.3 cm (69.02\")     Body mass index is 22.44 kg/m².  .BMIFOLLOWUP    Physical Exam:    Constitutional:  Well developed 19 y.o. female that appears in no apparent distress.  Awake & oriented times 3.  Normal mood with normal recent and remote memory and normal judgement.  Eyes:  Conjunctivae normal.      Self-administered [place] Sleepiness Scale test results: 811  0-5 Lower normal daytime sleepiness  6-10 Higher normal daytime sleepiness  11-12 Mild, 13-15 Moderate, & 16-24 Severe excessive daytime sleepiness        I have reviewed the above results and compared them with the patient's last downloads and reviewed with the patient.    Impression:     No diagnosis found.      Assessment & Plan  1. Narcolepsy.  Symptoms of sleep paralysis and difficulty waking up are consistent with narcolepsy. The patient is currently on Adderall and Lexapro and was previously on Vyvanse, which is unavailable due to supply issues. A low dose of oxybate will be initiated, with a gradual increase in dosage over a week. She is advised to start the medication over the weekend. A copy of the treatment plan will be sent to her primary care physician, Dr. Lavell More.    Follow-up  The patient will follow up in 2 months.         Good sleep hygiene measures should be maintained.      I answered all of the patient's questions.  The patient will call the [facility] for any problems and will follow up 2 months.    Patient or patient representative verbalized consent for the use of Ambient Listening during the visit with  Rey Sood MD for chart documentation. 7/10/2025  14:50 EDT           Rey Sood MD  Sleep Medicine  07/10/25  14:31 EDT          "

## 2025-07-31 ENCOUNTER — TELEPHONE (OUTPATIENT)
Dept: FAMILY MEDICINE CLINIC | Facility: CLINIC | Age: 20
End: 2025-07-31
Payer: COMMERCIAL

## 2025-08-01 ENCOUNTER — OFFICE VISIT (OUTPATIENT)
Dept: FAMILY MEDICINE CLINIC | Facility: CLINIC | Age: 20
End: 2025-08-01
Payer: COMMERCIAL

## 2025-08-01 VITALS
HEIGHT: 69 IN | BODY MASS INDEX: 21.62 KG/M2 | SYSTOLIC BLOOD PRESSURE: 116 MMHG | OXYGEN SATURATION: 99 % | DIASTOLIC BLOOD PRESSURE: 78 MMHG | HEART RATE: 101 BPM | WEIGHT: 146 LBS

## 2025-08-01 DIAGNOSIS — G47.20 CIRCADIAN RHYTHM DISORDER: ICD-10-CM

## 2025-08-01 DIAGNOSIS — G89.29 CHRONIC BILATERAL LOW BACK PAIN WITHOUT SCIATICA: ICD-10-CM

## 2025-08-01 DIAGNOSIS — F90.0 ATTENTION DEFICIT HYPERACTIVITY DISORDER (ADHD), PREDOMINANTLY INATTENTIVE TYPE: ICD-10-CM

## 2025-08-01 DIAGNOSIS — G47.411 NARCOLEPSY WITH CATAPLEXY: Primary | ICD-10-CM

## 2025-08-01 DIAGNOSIS — F51.9 SEVERE MORNING SLEEP INERTIA: ICD-10-CM

## 2025-08-01 DIAGNOSIS — M54.50 CHRONIC BILATERAL LOW BACK PAIN WITHOUT SCIATICA: ICD-10-CM

## 2025-08-01 RX ORDER — DEXTROAMPHETAMINE SACCHARATE, AMPHETAMINE ASPARTATE, DEXTROAMPHETAMINE SULFATE AND AMPHETAMINE SULFATE 7.5; 7.5; 7.5; 7.5 MG/1; MG/1; MG/1; MG/1
30 TABLET ORAL DAILY
Qty: 90 TABLET | Refills: 0 | Status: SHIPPED | OUTPATIENT
Start: 2025-08-01

## 2025-08-01 RX ORDER — PITOLISANT HYDROCHLORIDE 17.8 MG/1
TABLET, FILM COATED ORAL
COMMUNITY
Start: 2025-07-29

## 2025-08-01 RX ORDER — PITOLISANT HYDROCHLORIDE 4.45 MG/1
TABLET, FILM COATED ORAL
COMMUNITY
Start: 2025-07-28

## 2025-08-05 ENCOUNTER — TELEPHONE (OUTPATIENT)
Dept: FAMILY MEDICINE CLINIC | Facility: CLINIC | Age: 20
End: 2025-08-05
Payer: COMMERCIAL